# Patient Record
Sex: MALE | Race: WHITE | NOT HISPANIC OR LATINO | Employment: FULL TIME | URBAN - METROPOLITAN AREA
[De-identification: names, ages, dates, MRNs, and addresses within clinical notes are randomized per-mention and may not be internally consistent; named-entity substitution may affect disease eponyms.]

---

## 2022-01-07 ENCOUNTER — OFFICE VISIT (OUTPATIENT)
Dept: ENDOCRINOLOGY | Facility: CLINIC | Age: 52
End: 2022-01-07

## 2022-01-07 ENCOUNTER — LAB (OUTPATIENT)
Dept: LAB | Facility: HOSPITAL | Age: 52
End: 2022-01-07

## 2022-01-07 VITALS
HEART RATE: 80 BPM | OXYGEN SATURATION: 98 % | HEIGHT: 69 IN | WEIGHT: 190 LBS | BODY MASS INDEX: 28.14 KG/M2 | DIASTOLIC BLOOD PRESSURE: 66 MMHG | SYSTOLIC BLOOD PRESSURE: 124 MMHG

## 2022-01-07 DIAGNOSIS — R76.8 THYROID ANTIBODY POSITIVE: Primary | ICD-10-CM

## 2022-01-07 DIAGNOSIS — E55.9 VITAMIN D DEFICIENCY: ICD-10-CM

## 2022-01-07 PROCEDURE — 84439 ASSAY OF FREE THYROXINE: CPT | Performed by: INTERNAL MEDICINE

## 2022-01-07 PROCEDURE — 82306 VITAMIN D 25 HYDROXY: CPT | Performed by: INTERNAL MEDICINE

## 2022-01-07 PROCEDURE — 99204 OFFICE O/P NEW MOD 45 MIN: CPT | Performed by: INTERNAL MEDICINE

## 2022-01-07 PROCEDURE — 84443 ASSAY THYROID STIM HORMONE: CPT | Performed by: INTERNAL MEDICINE

## 2022-01-07 RX ORDER — AMLODIPINE BESYLATE 5 MG/1
5 TABLET ORAL DAILY
COMMUNITY
Start: 2022-01-05

## 2022-01-07 RX ORDER — OMEPRAZOLE 40 MG/1
40 CAPSULE, DELAYED RELEASE ORAL DAILY
COMMUNITY
Start: 2022-01-05

## 2022-01-07 RX ORDER — HYDROXYCHLOROQUINE SULFATE 200 MG/1
200 TABLET, FILM COATED ORAL 2 TIMES DAILY
COMMUNITY
Start: 2022-01-05

## 2022-01-07 NOTE — PROGRESS NOTES
Chief Complaint   Patient presents with   • Thyroid Problem     positive antibodies        New patient who is being seen in consultation regarding positive thyroid antibodies at the request of Kiel Bashir*     HPI   Kavon Koo is a 51 y.o. male who presents for evaluation of positive thyroid antibodies.    Patient presents today for evaluation of positive thyroid antibodies which was diagnosed in September 2021 when patient presented for evaluation with rheumatology.  Patient denies known history of thyroid dysfunction prior to this. Patient is not currently on medication for this.     Patient denies palpiltations, anxiety.  Patient denies changes in bowel habits.   Patient denies heat or cold intolerance.  Patient denies changes in weight.  Patient dnies hair, skin or nail changes.  Patient reports tremor in the bilateral hands.  Patient reports improving energy level.  He reports that he take vitamin D 87547 international units weekly for 1 month after labs in the fall and reports this helped with energy.  He has since transition to an over the counter supplementation. He has not had repeat labs.    Patient denies history of previous head/neck radiation.  Patient denies history of recent iodine exposure.  Patient denies taking OTC supplements such as biotin.  Patient denies personal or family history of thyroid cancer.     Past Medical History:   Diagnosis Date   • Arthritis    • Hypertension      Past Surgical History:   Procedure Laterality Date   • WISDOM TOOTH EXTRACTION        Family History   Problem Relation Age of Onset   • Parkinsonism Mother    • Alzheimer's disease Father    • No Known Problems Sister    • No Known Problems Brother       Social History     Socioeconomic History   • Marital status:    Tobacco Use   • Smoking status: Never Smoker   • Smokeless tobacco: Current User     Types: Snuff   Vaping Use   • Vaping Use: Never used   Substance and Sexual Activity   • Alcohol  "use: Yes     Comment: occasionally   • Drug use: Never   • Sexual activity: Defer      No Known Allergies   Current Outpatient Medications on File Prior to Visit   Medication Sig Dispense Refill   • amLODIPine (NORVASC) 5 MG tablet Take 5 mg by mouth Daily.     • hydroxychloroquine (PLAQUENIL) 200 MG tablet Take 200 mg by mouth 2 (Two) Times a Day.     • omeprazole (priLOSEC) 40 MG capsule Take 40 mg by mouth Daily.       No current facility-administered medications on file prior to visit.        Review of Systems   Constitutional: Positive for fatigue. Negative for unexpected weight gain and unexpected weight loss.   HENT: Positive for hearing loss. Negative for trouble swallowing and voice change.    Eyes: Negative for pain and visual disturbance.   Respiratory: Negative for cough and shortness of breath.    Cardiovascular: Negative for palpitations and leg swelling.   Gastrointestinal: Positive for GERD. Negative for constipation and diarrhea.   Endocrine: Positive for cold intolerance. Negative for heat intolerance.   Musculoskeletal: Positive for arthralgias, back pain, joint swelling, myalgias and neck stiffness.   Skin: Positive for color change. Negative for rash.   Neurological: Positive for numbness and headache.   Psychiatric/Behavioral: Negative for sleep disturbance. The patient is not nervous/anxious.         Vitals:    01/07/22 1012   BP: 124/66   BP Location: Right arm   Patient Position: Sitting   Cuff Size: Adult   Pulse: 80   SpO2: 98%   Weight: 86.2 kg (190 lb)   Height: 175.3 cm (69\")   Body mass index is 28.06 kg/m².     Physical Exam  Vitals reviewed.   Constitutional:       General: He is not in acute distress.     Appearance: Normal appearance.   HENT:      Head: Normocephalic and atraumatic.      Right Ear: Hearing normal.      Left Ear: Hearing normal.      Nose: Nose normal.   Eyes:      General: Lids are normal.      Conjunctiva/sclera: Conjunctivae normal.   Neck:      Thyroid: No " thyromegaly or thyroid tenderness.   Cardiovascular:      Rate and Rhythm: Normal rate and regular rhythm.      Heart sounds: No murmur heard.      Pulmonary:      Effort: Pulmonary effort is normal.      Breath sounds: Normal breath sounds and air entry.   Abdominal:      General: Bowel sounds are normal.      Palpations: Abdomen is soft.      Tenderness: There is no abdominal tenderness.   Lymphadenopathy:      Head:      Right side of head: No submandibular adenopathy.      Left side of head: No submandibular adenopathy.      Cervical: No cervical adenopathy.   Skin:     General: Skin is warm and dry.      Findings: No rash.   Neurological:      General: No focal deficit present.      Deep Tendon Reflexes: Reflexes are normal and symmetric.   Psychiatric:         Mood and Affect: Mood and affect normal.         Behavior: Behavior is cooperative.        Labs/Imaging  Labs dated 9/15/2021  Antithyroglobulin 100.9, reference range 0-50  Antithyroid peroxidase antibodies 3.8, reference range 0-15  Vitamin D 30, reference range     Assessment and Plan    Diagnoses and all orders for this visit:    1. Thyroid antibody positive (Primary)  -Patient with positive antithyroglobulin and TPO antibodies.  -Reviewed that thyroid antibodies are a risk factor but not a guarantee of thyroid dysfunction.  We will plan to obtain thyroid function testing today, discussed the management of hypothyroidism and potential side effects of levothyroxine as well as appropriate administration, if needed.  -Determine next steps after review of labs  -     TSH  -     T4, Free    2. Vitamin D deficiency  -Status post replacement with 50,000 international units weekly for 1 month  -Now taking over-the-counter supplementation, dose unknown  -Repeat levels today  -     Vitamin D 25 Hydroxy      Return if symptoms worsen or fail to improve. The patient was instructed to contact the clinic with any interval questions or concerns.    Nena RASHID  MD Siri     Dictated Utilizing Dragon Dictation

## 2022-01-08 LAB
25(OH)D3 SERPL-MCNC: 46.6 NG/ML
T4 FREE SERPL-MCNC: 1.52 NG/DL (ref 0.93–1.7)
TSH SERPL DL<=0.05 MIU/L-ACNC: 2.97 UIU/ML (ref 0.27–4.2)